# Patient Record
Sex: FEMALE | URBAN - METROPOLITAN AREA
[De-identification: names, ages, dates, MRNs, and addresses within clinical notes are randomized per-mention and may not be internally consistent; named-entity substitution may affect disease eponyms.]

---

## 2019-07-12 ENCOUNTER — APPOINTMENT (RX ONLY)
Dept: URBAN - METROPOLITAN AREA CLINIC 152 | Facility: CLINIC | Age: 64
Setting detail: DERMATOLOGY
End: 2019-07-12

## 2019-07-12 DIAGNOSIS — L50.0 ALLERGIC URTICARIA: ICD-10-CM

## 2019-07-12 PROBLEM — M12.9 ARTHROPATHY, UNSPECIFIED: Status: ACTIVE | Noted: 2019-07-12

## 2019-07-12 PROCEDURE — ? COUNSELING

## 2019-07-12 PROCEDURE — ? PRESCRIPTION

## 2019-07-12 PROCEDURE — 99202 OFFICE O/P NEW SF 15 MIN: CPT

## 2019-07-12 RX ORDER — DESONIDE 0.5 MG/G
CREAM TOPICAL
Qty: 1 | Refills: 3 | Status: ERX | COMMUNITY
Start: 2019-07-12

## 2019-07-12 RX ADMIN — DESONIDE: 0.5 CREAM TOPICAL at 15:54

## 2019-07-12 ASSESSMENT — LOCATION DETAILED DESCRIPTION DERM
LOCATION DETAILED: LEFT SUPERIOR CENTRAL MALAR CHEEK
LOCATION DETAILED: LEFT CENTRAL MALAR CHEEK
LOCATION DETAILED: RIGHT CENTRAL MALAR CHEEK
LOCATION DETAILED: RIGHT LATERAL MALAR CHEEK

## 2019-07-12 ASSESSMENT — LOCATION SIMPLE DESCRIPTION DERM
LOCATION SIMPLE: LEFT CHEEK
LOCATION SIMPLE: RIGHT CHEEK

## 2019-07-12 ASSESSMENT — LOCATION ZONE DERM: LOCATION ZONE: FACE

## 2024-07-31 ENCOUNTER — APPOINTMENT (RX ONLY)
Dept: URBAN - METROPOLITAN AREA CLINIC 153 | Facility: CLINIC | Age: 69
Setting detail: DERMATOLOGY
End: 2024-07-31

## 2024-07-31 DIAGNOSIS — Z41.8 ENCOUNTER FOR OTHER PROCEDURES FOR PURPOSES OTHER THAN REMEDYING HEALTH STATE: ICD-10-CM

## 2024-07-31 PROCEDURE — ? INVENTORY

## 2024-07-31 PROCEDURE — ? DELUXE HYDRAFACIAL

## 2024-07-31 ASSESSMENT — LOCATION SIMPLE DESCRIPTION DERM: LOCATION SIMPLE: RIGHT FOREHEAD

## 2024-07-31 ASSESSMENT — LOCATION ZONE DERM: LOCATION ZONE: FACE

## 2024-07-31 ASSESSMENT — LOCATION DETAILED DESCRIPTION DERM: LOCATION DETAILED: RIGHT INFERIOR MEDIAL FOREHEAD

## 2024-07-31 NOTE — PROCEDURE: DELUXE HYDRAFACIAL
Vacuum Pressure High Setting (Will Not Render If Set To 0): 0
Solution: Beta-HD
Solution Override
Procedure: Exfoliation
Procedure: Fusion
Tip: Hydropeel Tip, Clear
Consent: Written consent obtained, risks reviewed including but not limited to crusting, scabbing, blistering, scarring, darker or lighter pigmentary change, bruising, and/or incomplete response.
Location: face
Tip: Hydropeel Tip, Teal
Solution Override: [id number]
Post-Care Instructions: I reviewed with the patient in detail post-care instructions. Patient should stay away from the sun and wear sun protection until treated areas are fully healed.
Solution: Activ-4
Procedure: Peel
Tip Override
Procedure: Boost
Treatment Number: 1
Procedure: Extraction
Solution: GlySal 7.5%
Procedure: Extend and Protect
Tip: Hydropeel Tip, Blue
Indication: skin texture